# Patient Record
Sex: FEMALE | Race: ASIAN | NOT HISPANIC OR LATINO | URBAN - METROPOLITAN AREA
[De-identification: names, ages, dates, MRNs, and addresses within clinical notes are randomized per-mention and may not be internally consistent; named-entity substitution may affect disease eponyms.]

---

## 2018-11-26 ENCOUNTER — EMERGENCY (EMERGENCY)
Facility: HOSPITAL | Age: 23
LOS: 1 days | Discharge: HOME | End: 2018-11-26
Admitting: EMERGENCY MEDICINE
Payer: MEDICAID

## 2018-11-26 VITALS
OXYGEN SATURATION: 100 % | HEART RATE: 97 BPM | DIASTOLIC BLOOD PRESSURE: 85 MMHG | SYSTOLIC BLOOD PRESSURE: 125 MMHG | RESPIRATION RATE: 16 BRPM | TEMPERATURE: 98 F

## 2018-11-26 LAB
BLD GP AB SCN SERPL QL: NEGATIVE — SIGNIFICANT CHANGE UP
BUN SERPL-MCNC: 9 MG/DL — SIGNIFICANT CHANGE UP (ref 7–23)
CALCIUM SERPL-MCNC: 9.4 MG/DL — SIGNIFICANT CHANGE UP (ref 8.4–10.5)
CHLORIDE SERPL-SCNC: 99 MMOL/L — SIGNIFICANT CHANGE UP (ref 98–107)
CO2 SERPL-SCNC: 24 MMOL/L — SIGNIFICANT CHANGE UP (ref 22–31)
CREAT SERPL-MCNC: 0.59 MG/DL — SIGNIFICANT CHANGE UP (ref 0.5–1.3)
GLUCOSE SERPL-MCNC: 91 MG/DL — SIGNIFICANT CHANGE UP (ref 70–99)
HCT VFR BLD CALC: 38.5 % — SIGNIFICANT CHANGE UP (ref 34.5–45)
HGB BLD-MCNC: 11.8 G/DL — SIGNIFICANT CHANGE UP (ref 11.5–15.5)
MCHC RBC-ENTMCNC: 22.6 PG — LOW (ref 27–34)
MCHC RBC-ENTMCNC: 30.6 % — LOW (ref 32–36)
MCV RBC AUTO: 73.6 FL — LOW (ref 80–100)
NRBC # FLD: 0 — SIGNIFICANT CHANGE UP
PLATELET # BLD AUTO: 356 K/UL — SIGNIFICANT CHANGE UP (ref 150–400)
PMV BLD: 9.1 FL — SIGNIFICANT CHANGE UP (ref 7–13)
POTASSIUM SERPL-MCNC: 3.7 MMOL/L — SIGNIFICANT CHANGE UP (ref 3.5–5.3)
POTASSIUM SERPL-SCNC: 3.7 MMOL/L — SIGNIFICANT CHANGE UP (ref 3.5–5.3)
RBC # BLD: 5.23 M/UL — HIGH (ref 3.8–5.2)
RBC # FLD: 18.4 % — HIGH (ref 10.3–14.5)
RH IG SCN BLD-IMP: POSITIVE — SIGNIFICANT CHANGE UP
SODIUM SERPL-SCNC: 136 MMOL/L — SIGNIFICANT CHANGE UP (ref 135–145)
WBC # BLD: 8.02 K/UL — SIGNIFICANT CHANGE UP (ref 3.8–10.5)
WBC # FLD AUTO: 8.02 K/UL — SIGNIFICANT CHANGE UP (ref 3.8–10.5)

## 2018-11-26 PROCEDURE — 99283 EMERGENCY DEPT VISIT LOW MDM: CPT

## 2018-11-26 NOTE — ED PROVIDER NOTE - MEDICAL DECISION MAKING DETAILS
22 y/o female with worsening increasing frequency and severity of menstrual periods x 2 years  -concern for amenima secondary to heavy vaginal bleeding - possible DUB vs cyst vs fibroids  -cbc bmp t&s, pelvic exam, ucg

## 2018-11-26 NOTE — ED ADULT TRIAGE NOTE - CHIEF COMPLAINT QUOTE
pt comes to ED for vaginal bleeding x 2 weeks. pt has been using 4 pads a day. pt VSS pt denies needing a blood transfusion.

## 2018-11-26 NOTE — ED PROVIDER NOTE - PROGRESS NOTE DETAILS
Pt.s hemoglobin 11.8 and feeling well, pelvic exam unremarkable for large amount of blood in vault. stable for dc with obgyn clinic follow up.

## 2018-11-26 NOTE — ED ADULT NURSE NOTE - OBJECTIVE STATEMENT
Pt c/o vaginal bleeding x 2 weeks, using 4 pads a day. Pt AAOx3, respirations even and unlabored.  Pt denies N/V/D/constipation.  Pt denies dizziness/chest pain/palpitations. Pt denies history of blood transfusions.  Labs drawn and sent; IV access obtained.  Will continue to monitor.

## 2018-11-26 NOTE — ED PROVIDER NOTE - OBJECTIVE STATEMENT
23 year old female with no PMHx cheng speaking (sister edwin aguilar) presents with sister-in-law to the ED c/o increased frequency of menstrual cycles since January 2018. Patient also reports that her periods have been lasting longer since 2017 and describes them as clotting in nature. Patient also notes generalized fatigue, weakness and nausea for some time. Pt. states this period starte dtwo days ago and 3 pads- 4 pads a day. Patient was evaluated for similar symptoms five months ago and offered oral contraceptives but declined because she was trying to conceive at the time. Patient has history of right ovarian cyst however denies any suprapubic pain today. Patient reports last normal PAP smear was 2 months ago. Patient denies having an OB/GYN. Patient denies history of STD or pregnancy. Patient further denies fever, chills, vomiting, dysuria, hematuria, diarrhea or constipation. Patient denies history of being transfused.

## 2024-12-19 NOTE — ED PROVIDER NOTE - CPE EDP GU CHAPERONE
Called patient in regards to scheduling colonoscopy, patient stated she already has it scheduled for 1/7/2025 but will call back if she needs anything    Referral in [facility] closed   GEORGETTE Jones chaperone. BEBA Jerez performing exam./Name of Chaperone